# Patient Record
Sex: FEMALE | Race: WHITE | NOT HISPANIC OR LATINO | Employment: FULL TIME | URBAN - METROPOLITAN AREA
[De-identification: names, ages, dates, MRNs, and addresses within clinical notes are randomized per-mention and may not be internally consistent; named-entity substitution may affect disease eponyms.]

---

## 2024-03-12 NOTE — PROGRESS NOTES
CC: This 54 y.o. female presents for management of diabetes  and chronic conditions pending review including HTN, HLP, hypothyroidism     HPI: She was diagnosed with T2DM in October 2023. Has never been hospitalized r/t DM.  Family hx of DM: father and father's side of the family    Arrives new to endocrine today.   Previously seen by Dr Damon, A1C 12% at diagnosed, started on MDI and ozempic  Has lost 10 lbs since since diagnosis  Wearing Freestyle Guero 3- see download in Media tab  BG > 250 0%  BG > 180  4%  BG   96%  BG < 70 0%  Very small normal pp excursions  BG look terrific, occasional falsely low hypos    Diet: Eats 3 Meals a day, snacks- apple, [place], celery w PB  Exercise: walking 10 mins in am, and at home weekly 20 mins  CURRENT DM MEDS: Ozempic 0.5 mg weekly  Standards of Care:  Eye exam:  > 1 year- she will schedule     Hypothyroidism:   Dx'ed in her early 20s  Fmh: brother w hyperthyroidism   She takes LT4 200 mcg daily  Sh was taking Brand until earlier this year for insurance purposes- has not noticed a difference w the change at this time  No hoarseness, voice changes, dysphagia, compressive symptoms, or head/neck exposure to XRT.   No personal or FH of thyroid cancer or MEN syndrome.   Not taking biotin.   No tremors of the hands  No intolerance to the heat or cold  + hair shedding since- Covid + 2022-      ROS:   Gen: Appetite good,    Eyes: Denies visual disturbances  Resp: no SOB or MAHER   Cardiac: No palpitations, chest pain   GI: No nausea or vomiting, diarrhea, constipation   /GYN: No nocturia, burning or pain.   MS/Neuro: Denies numbness/ tingling in BLE; Gait steady, speech clear  Psych: + anxiety.  Other systems: negative.    PE:  GENERAL: Well developed, well nourished.  PSYCH: AAOx3, appropriate mood and affect, pleasant expression, conversant, appears relaxed, well groomed.   EYES: Conjunctiva, corneas clear  NECK: Supple, trachea midline,   VASCULAR: DP pulses +2/4  "bilaterally, no edema.  NEURO: Gait steady  SKIN:  no acanthosis nigracans.  FOOT EXAMINATION: 3/13/2024  No foot deformity, corns or callus formation,  nails in good condition and well trimmed, no interspace maceration or ulceration noted.  Decreased hair growth present over toes/feet.    Protective sensation intact with 10 gram monofilament.  +2 dorsalis pedis and posterior pulses noted.     Personally reviewed Past Medical, Surgical, Social History.    BP (!) 142/84 (BP Location: Right arm, Patient Position: Sitting, BP Method: X-Large (Manual))   Pulse 78   Ht 5' 6" (1.676 m)   Wt 107.6 kg (237 lb 3.4 oz)   BMI 38.29 kg/m²      Personally reviewed the below labs:      Chemistry    No results found for: "NA", "K", "CL", "CO2", "BUN", "CREATININE", "GLU" No results found for: "CALCIUM", "ALKPHOS", "AST", "ALT", "BILITOT", "ESTGFRAFRICA", "EGFRNONAA"         No results found for: "TSH"           No results found for this or any previous visit.  No results found for this or any previous visit.    No results found for: "MICALBCREAT"    No results found for: "HGBA1C"     ASSESSMENT and PLAN:      1. T2DM controlled  Continue Ozempic 0.5 mg weekly and Freestyle Guero 3  Notify me for any issue    2. HTN - un controlled today, continue meds as previously prescribed and monitor.     3. HLP -  on statin therapy, lab w RTC    4. Hypothyroidism- switched from Brand to generic ~ 8 weeks ago, will check TSh now    6. Obesity- working on increasing exercise, discussed goal 30 mins a day, 5 days a week, recently saw DM edu for diet and has been helpful  Body mass index is 38.29 kg/m².    6. Anxiety- chronic, controlled on lexapro     Follow-up: in 6 months with TSH, lipid, UMCR, CMP, 1c          "

## 2024-03-13 ENCOUNTER — OFFICE VISIT (OUTPATIENT)
Dept: ENDOCRINOLOGY | Facility: CLINIC | Age: 54
End: 2024-03-13
Payer: COMMERCIAL

## 2024-03-13 VITALS
HEIGHT: 66 IN | HEART RATE: 78 BPM | DIASTOLIC BLOOD PRESSURE: 84 MMHG | SYSTOLIC BLOOD PRESSURE: 142 MMHG | WEIGHT: 237.19 LBS | BODY MASS INDEX: 38.12 KG/M2

## 2024-03-13 DIAGNOSIS — E66.9 OBESITY (BMI 30-39.9): ICD-10-CM

## 2024-03-13 DIAGNOSIS — E11.9 TYPE 2 DIABETES MELLITUS WITHOUT COMPLICATION, WITHOUT LONG-TERM CURRENT USE OF INSULIN: Primary | ICD-10-CM

## 2024-03-13 DIAGNOSIS — F41.9 ANXIETY: ICD-10-CM

## 2024-03-13 DIAGNOSIS — I10 PRIMARY HYPERTENSION: ICD-10-CM

## 2024-03-13 DIAGNOSIS — E78.2 MIXED HYPERLIPIDEMIA: ICD-10-CM

## 2024-03-13 DIAGNOSIS — E03.9 ACQUIRED HYPOTHYROIDISM: ICD-10-CM

## 2024-03-13 PROCEDURE — 95251 CONT GLUC MNTR ANALYSIS I&R: CPT | Mod: S$GLB,,, | Performed by: NURSE PRACTITIONER

## 2024-03-13 PROCEDURE — 1159F MED LIST DOCD IN RCRD: CPT | Mod: CPTII,S$GLB,, | Performed by: NURSE PRACTITIONER

## 2024-03-13 PROCEDURE — 3079F DIAST BP 80-89 MM HG: CPT | Mod: CPTII,S$GLB,, | Performed by: NURSE PRACTITIONER

## 2024-03-13 PROCEDURE — 99204 OFFICE O/P NEW MOD 45 MIN: CPT | Mod: S$GLB,,, | Performed by: NURSE PRACTITIONER

## 2024-03-13 PROCEDURE — 3008F BODY MASS INDEX DOCD: CPT | Mod: CPTII,S$GLB,, | Performed by: NURSE PRACTITIONER

## 2024-03-13 PROCEDURE — 3077F SYST BP >= 140 MM HG: CPT | Mod: CPTII,S$GLB,, | Performed by: NURSE PRACTITIONER

## 2024-03-13 RX ORDER — ROSUVASTATIN CALCIUM 10 MG/1
10 TABLET, COATED ORAL NIGHTLY
COMMUNITY

## 2024-03-13 RX ORDER — INSULIN LISPRO 100 [IU]/ML
5-11 INJECTION, SOLUTION INTRAVENOUS; SUBCUTANEOUS
COMMUNITY
Start: 2023-10-11 | End: 2024-03-13

## 2024-03-13 RX ORDER — SEMAGLUTIDE 0.68 MG/ML
0.5 INJECTION, SOLUTION SUBCUTANEOUS
COMMUNITY
End: 2024-03-13 | Stop reason: SDUPTHER

## 2024-03-13 RX ORDER — VERAPAMIL HYDROCHLORIDE 240 MG/1
240 TABLET, FILM COATED, EXTENDED RELEASE ORAL
COMMUNITY

## 2024-03-13 RX ORDER — BENZONATATE 200 MG/1
200 CAPSULE ORAL
COMMUNITY
Start: 2024-02-16

## 2024-03-13 RX ORDER — CYCLOSPORINE 0.5 MG/ML
1 EMULSION OPHTHALMIC EVERY 12 HOURS
COMMUNITY
Start: 2023-08-31

## 2024-03-13 RX ORDER — INSULIN GLARGINE 100 [IU]/ML
20 INJECTION, SOLUTION SUBCUTANEOUS
COMMUNITY
Start: 2023-10-11 | End: 2024-03-13

## 2024-03-13 RX ORDER — PEN NEEDLE, DIABETIC 31 GX5/16"
NEEDLE, DISPOSABLE MISCELLANEOUS
COMMUNITY
End: 2024-03-13

## 2024-03-13 RX ORDER — BLOOD-GLUCOSE SENSOR
EACH MISCELLANEOUS
Qty: 6 EACH | Refills: 3 | Status: SHIPPED | OUTPATIENT
Start: 2024-03-13

## 2024-03-13 RX ORDER — LEVOTHYROXINE SODIUM 200 UG/1
200 TABLET ORAL
COMMUNITY

## 2024-03-13 RX ORDER — ESCITALOPRAM OXALATE 20 MG/1
20 TABLET ORAL
COMMUNITY

## 2024-03-13 RX ORDER — GLUCAGON INJECTION, SOLUTION 1 MG/.2ML
1 INJECTION, SOLUTION SUBCUTANEOUS
COMMUNITY
Start: 2023-10-20

## 2024-03-13 RX ORDER — ALBUTEROL SULFATE 90 UG/1
2 AEROSOL, METERED RESPIRATORY (INHALATION) EVERY 4 HOURS PRN
COMMUNITY

## 2024-03-13 RX ORDER — SEMAGLUTIDE 0.68 MG/ML
0.5 INJECTION, SOLUTION SUBCUTANEOUS
Qty: 9 ML | Refills: 3 | Status: SHIPPED | OUTPATIENT
Start: 2024-03-13

## 2024-03-13 RX ORDER — BLOOD-GLUCOSE SENSOR
1 EACH MISCELLANEOUS
COMMUNITY
Start: 2023-10-20 | End: 2024-03-13 | Stop reason: SDUPTHER

## 2024-03-13 RX ORDER — ONDANSETRON 8 MG/1
8 TABLET, ORALLY DISINTEGRATING ORAL
COMMUNITY
Start: 2023-10-20

## 2024-11-21 ENCOUNTER — TELEPHONE (OUTPATIENT)
Dept: ENDOCRINOLOGY | Facility: CLINIC | Age: 54
End: 2024-11-21
Payer: COMMERCIAL

## 2024-11-21 NOTE — TELEPHONE ENCOUNTER
Rec'd fax from [facility] that Guero 3 sensors are approved from 11/18/2024-11/18/2025.

## 2024-12-27 ENCOUNTER — TELEPHONE (OUTPATIENT)
Dept: ENDOCRINOLOGY | Facility: CLINIC | Age: 54
End: 2024-12-27
Payer: COMMERCIAL

## 2024-12-27 DIAGNOSIS — E11.9 TYPE 2 DIABETES MELLITUS WITHOUT COMPLICATION, WITHOUT LONG-TERM CURRENT USE OF INSULIN: Primary | ICD-10-CM

## 2024-12-27 NOTE — TELEPHONE ENCOUNTER
S/w pt. Moved pt appt up earlier to 2:30pm. Sent pts lab orders to [employer] [facility] [employer] and also printed and mailed them to pt. Verbalized understanding.

## 2024-12-27 NOTE — TELEPHONE ENCOUNTER
----- Message from Jessica sent at 12/27/2024  8:28 AM CST -----  Regarding: advise  Contact: patient  Type: Needs Medical Advice  Who Called:  patient  Symptoms (please be specific):    How long has patient had these symptoms:    Pharmacy name and phone #:    Best Call Back Number: [phone]    Additional Information: pako corona can you check and see if carley arana has a sooner time on 1/13/2025 MRN: [id number] AIDE BARNETT .

## 2025-01-13 ENCOUNTER — OFFICE VISIT (OUTPATIENT)
Dept: ENDOCRINOLOGY | Facility: CLINIC | Age: 55
End: 2025-01-13
Payer: COMMERCIAL

## 2025-01-13 VITALS
OXYGEN SATURATION: 98 % | BODY MASS INDEX: 39.58 KG/M2 | DIASTOLIC BLOOD PRESSURE: 84 MMHG | SYSTOLIC BLOOD PRESSURE: 122 MMHG | WEIGHT: 246.25 LBS | HEIGHT: 66 IN | HEART RATE: 69 BPM

## 2025-01-13 DIAGNOSIS — E03.9 ACQUIRED HYPOTHYROIDISM: ICD-10-CM

## 2025-01-13 DIAGNOSIS — I10 PRIMARY HYPERTENSION: ICD-10-CM

## 2025-01-13 DIAGNOSIS — E11.9 TYPE 2 DIABETES MELLITUS WITHOUT COMPLICATION, WITHOUT LONG-TERM CURRENT USE OF INSULIN: Primary | ICD-10-CM

## 2025-01-13 DIAGNOSIS — E78.01 FAMILIAL HYPERCHOLESTEROLEMIA: ICD-10-CM

## 2025-01-13 DIAGNOSIS — E66.9 OBESITY (BMI 30-39.9): ICD-10-CM

## 2025-01-13 PROCEDURE — G2211 COMPLEX E/M VISIT ADD ON: HCPCS | Mod: S$GLB,,, | Performed by: NURSE PRACTITIONER

## 2025-01-13 PROCEDURE — 3044F HG A1C LEVEL LT 7.0%: CPT | Mod: CPTII,S$GLB,, | Performed by: NURSE PRACTITIONER

## 2025-01-13 PROCEDURE — 3074F SYST BP LT 130 MM HG: CPT | Mod: CPTII,S$GLB,, | Performed by: NURSE PRACTITIONER

## 2025-01-13 PROCEDURE — 99214 OFFICE O/P EST MOD 30 MIN: CPT | Mod: S$GLB,,, | Performed by: NURSE PRACTITIONER

## 2025-01-13 PROCEDURE — 3079F DIAST BP 80-89 MM HG: CPT | Mod: CPTII,S$GLB,, | Performed by: NURSE PRACTITIONER

## 2025-01-13 PROCEDURE — 3008F BODY MASS INDEX DOCD: CPT | Mod: CPTII,S$GLB,, | Performed by: NURSE PRACTITIONER

## 2025-01-13 PROCEDURE — 1159F MED LIST DOCD IN RCRD: CPT | Mod: CPTII,S$GLB,, | Performed by: NURSE PRACTITIONER

## 2025-01-13 PROCEDURE — 99999 PR PBB SHADOW E&M-EST. PATIENT-LVL III: CPT | Mod: PBBFAC,,, | Performed by: NURSE PRACTITIONER

## 2025-01-13 PROCEDURE — 95251 CONT GLUC MNTR ANALYSIS I&R: CPT | Mod: S$GLB,,, | Performed by: NURSE PRACTITIONER

## 2025-01-13 RX ORDER — AZELASTINE 1 MG/ML
SPRAY, METERED NASAL
COMMUNITY

## 2025-01-13 RX ORDER — SEMAGLUTIDE 0.68 MG/ML
INJECTION, SOLUTION SUBCUTANEOUS
Qty: 9 ML | Refills: 3 | Status: SHIPPED | OUTPATIENT
Start: 2025-01-13

## 2025-01-13 RX ORDER — ROSUVASTATIN CALCIUM 10 MG/1
10 TABLET, COATED ORAL NIGHTLY
Qty: 90 TABLET | Refills: 3 | Status: SHIPPED | OUTPATIENT
Start: 2025-01-13

## 2025-01-13 RX ORDER — LEVOTHYROXINE SODIUM 200 UG/1
200 TABLET ORAL
Qty: 90 TABLET | Refills: 3 | Status: SHIPPED | OUTPATIENT
Start: 2025-01-13

## 2025-01-13 NOTE — PROGRESS NOTES
"CC: This 54 y.o. female presents for management of diabetes  and chronic conditions pending review including HTN, HLP, hypothyroidism     HPI: She was diagnosed with T2DM in October 2023. Has never been hospitalized r/t DM.  Family hx of DM: father and father's side of the family    Mother passed away ~ Thanksgiving   Has not been taking ozempic or crestor   She has missed some LT4 doses   Wearing Freestyle Guero 3- see download in Media tab  BG > 250 0%  BG > 180  11%  BG  89%  BG < 70 0%  Very small normal pp excursions  BG look terrific, occasional falsely low hypos    Diet: Eats 2 Meals a day, snacks- apple, [place], celery w PB  Skips breakfast   Exercise: none  CURRENT DM MEDS: none  Standards of Care:  Eye exam:  7/2024     Hypothyroidism:   Dx'ed in her early 20s  Fmh: brother w hyperthyroidism   She takes LT4 200 mcg daily  No hoarseness, voice changes, dysphagia, compressive symptoms, or head/neck exposure to XRT.   No personal or FH of thyroid cancer or MEN syndrome.   Not taking biotin.   No tremors of the hands   No intolerance to the heat or cold  + hair shedding since- Covid + 2022-    Works in [employer]    ROS:   Gen: Appetite good,    Eyes: Denies visual disturbances  Resp: no SOB or MAHER   Cardiac: + palpitations at night maybe once a month lasts a few seconds "feels like a fish flopping" after getting into bed- none recently; no chest pain   GI: No nausea or vomiting, diarrhea, constipation   /GYN: No nocturia, burning or pain.   MS/Neuro: Denies numbness/ tingling in BLE; Gait steady, speech clear  Psych: + anxiety.  Other systems: negative.    PE:  GENERAL: Well developed, well nourished.  PSYCH: AAOx3, appropriate mood and affect, pleasant expression, conversant, appears relaxed, well groomed.   EYES: Conjunctiva, corneas clear  NECK: Supple, trachea midline,   VASCULAR: DP pulses +2/4 bilaterally, no edema.  NEURO: Gait steady  SKIN:  no acanthosis nigracans.  FOOT EXAMINATION: " "1/13/2025  No foot deformity, corns or callus formation,  nails in good condition and well trimmed, no interspace maceration or ulceration noted.  Decreased hair growth present over toes/feet.   Protective sensation intact with 10 gram monofilament.  +2 dorsalis pedis and posterior pulses noted.     Personally reviewed Past Medical, Surgical, Social History.    /84 (BP Location: Right arm, Patient Position: Sitting)   Pulse 69   Ht 5' 6" (1.676 m)   Wt 111.7 kg (246 lb 4.1 oz)   SpO2 98%   BMI 39.75 kg/m²      Personally reviewed the below labs:      Chemistry        Component Value Date/Time     01/11/2025 0818    K 4.3 01/11/2025 0818     01/11/2025 0818    CO2 27 01/11/2025 0818    BUN 10 01/11/2025 0818    CREATININE 0.76 01/11/2025 0818     (H) 01/11/2025 0818        Component Value Date/Time    CALCIUM 9.4 01/11/2025 0818    ALKPHOS 95 10/19/2023 0708    AST 17 01/11/2025 0818    ALT 19 01/11/2025 0818    BILITOT 0.6 01/11/2025 0818            Lab Results   Component Value Date    TSH 4.80 (H) 01/11/2025       Recent Labs   Lab 01/11/25  0818   LDL Cholesterol 192 H   HDL 46 L   Cholesterol 266 H        No results found for this or any previous visit.  No results found for this or any previous visit.    No results found for: "MICALBCREAT"    Hemoglobin A1C   Date Value Ref Range Status   01/11/2025 6.6 (H) <5.7 % of total Hgb Final     Comment:     For someone without known diabetes, a hemoglobin A1c  value of 6.5% or greater indicates that they may have   diabetes and this should be confirmed with a follow-up   test.     For someone with known diabetes, a value <7% indicates   that their diabetes is well controlled and a value   greater than or equal to 7% indicates suboptimal   control. A1c targets should be individualized based on   duration of diabetes, age, comorbid conditions, and   other considerations.     Currently, no consensus exists regarding use of  hemoglobin A1c for " diagnosis of diabetes for children.         01/07/2025 TNP % of total Hgb Final     Comment:     TEST NOT PERFORMED    No suitable specimen received.  Please review the test  requirements at   [email or web address]   05/02/2023 7.1 (H) <5.7 % of total Hgb Final     Comment:     For someone without known diabetes, a hemoglobin A1c  value of 6.5% or greater indicates that they may have   diabetes and this should be confirmed with a follow-up   test.    For someone with known diabetes, a value <7% indicates   that their diabetes is well controlled and a value   greater than or equal to 7% indicates suboptimal   control. A1c targets should be individualized based on   duration of diabetes, age, comorbid conditions, and   other considerations.    Currently, no consensus exists regarding use of  hemoglobin A1c for diagnosis of diabetes for children.       01/13/2022 7.9 (H) <5.7 % of total Hgb Final     Comment:     For someone without known diabetes, a hemoglobin A1c  value of 6.5% or greater indicates that they may have   diabetes and this should be confirmed with a follow-up   test.    For someone with known diabetes, a value <7% indicates   that their diabetes is well controlled and a value   greater than or equal to 7% indicates suboptimal   control. A1c targets should be individualized based on   duration of diabetes, age, comorbid conditions, and   other considerations.    Currently, no consensus exists regarding use of  hemoglobin A1c for diagnosis of diabetes for children.         % HEMOGLOBIN A1C   Date Value Ref Range Status   05/02/2023 7.1 (H) <5.7 % of total Hgb Final     Comment:     For someone without known diabetes, a hemoglobin A1c  value of 6.5% or greater indicates that they may have   diabetes and this should be confirmed with a follow-up   test.    For someone with known diabetes, a value <7% indicates   that their diabetes is well controlled and a value   greater than or equal to 7%  indicates suboptimal   control. A1c targets should be individualized based on   duration of diabetes, age, comorbid conditions, and   other considerations.    Currently, no consensus exists regarding use of  hemoglobin A1c for diagnosis of diabetes for children.            ASSESSMENT and PLAN:      1. T2DM controlled  Start Ozempic 0.25 mg once weekly x 4 weeks.  At week 5 increase to 0.5 mg weekly    No fmh MEN or medullary thyroid cancer  No personal hx of pancreatitis    Continue Guero 3, notify me for issues    2. HTN - controlled today, continue meds as previously prescribed and monitor.     3. HLP, +fmh HLP- resume statin     4. Hypothyroidism- has missed a few LT4 doses, Check lab w RTC, clinically euthyroid       5. Obesity-  Encouraged walking in her neighborhood  Body mass index is 39.75 kg/m².       Follow-up: in 6 months with A1C, UMCR, TSH

## 2025-02-25 ENCOUNTER — PATIENT MESSAGE (OUTPATIENT)
Dept: ENDOCRINOLOGY | Facility: CLINIC | Age: 55
End: 2025-02-25
Payer: COMMERCIAL

## 2025-04-28 DIAGNOSIS — E11.9 TYPE 2 DIABETES MELLITUS WITHOUT COMPLICATION, WITHOUT LONG-TERM CURRENT USE OF INSULIN: ICD-10-CM

## 2025-04-28 RX ORDER — BLOOD-GLUCOSE SENSOR
EACH MISCELLANEOUS
Qty: 6 EACH | Refills: 3 | Status: SHIPPED | OUTPATIENT
Start: 2025-04-28

## 2025-05-07 ENCOUNTER — PATIENT MESSAGE (OUTPATIENT)
Dept: ENDOCRINOLOGY | Facility: CLINIC | Age: 55
End: 2025-05-07
Payer: COMMERCIAL

## 2025-05-07 DIAGNOSIS — E11.9 TYPE 2 DIABETES MELLITUS WITHOUT COMPLICATION, WITHOUT LONG-TERM CURRENT USE OF INSULIN: ICD-10-CM

## 2025-05-07 RX ORDER — BLOOD-GLUCOSE SENSOR
EACH MISCELLANEOUS
Qty: 6 EACH | Refills: 3 | Status: SHIPPED | OUTPATIENT
Start: 2025-05-07

## 2025-07-09 ENCOUNTER — TELEPHONE (OUTPATIENT)
Dept: FAMILY MEDICINE | Facility: CLINIC | Age: 55
End: 2025-07-09
Payer: COMMERCIAL

## 2025-07-15 ENCOUNTER — TELEPHONE (OUTPATIENT)
Dept: FAMILY MEDICINE | Facility: CLINIC | Age: 55
End: 2025-07-15
Payer: COMMERCIAL

## 2025-07-15 NOTE — TELEPHONE ENCOUNTER
Copied from CRM #[id number]. Topic: Appointments - Amb Referral  >> Jul 15, 2025 10:31 AM Kenya wrote:  Patient just now seeing [employer] message. She can come in for fasting labs prior to appt. Please schedule pt for Thursday 7/17 [facility]. Pt can be reached at [phone].

## 2025-07-18 ENCOUNTER — OFFICE VISIT (OUTPATIENT)
Dept: FAMILY MEDICINE | Facility: CLINIC | Age: 55
End: 2025-07-18
Payer: COMMERCIAL

## 2025-07-18 ENCOUNTER — HOSPITAL ENCOUNTER (OUTPATIENT)
Dept: RADIOLOGY | Facility: HOSPITAL | Age: 55
Discharge: HOME | End: 2025-07-18
Attending: FAMILY MEDICINE
Payer: COMMERCIAL

## 2025-07-18 VITALS
SYSTOLIC BLOOD PRESSURE: 130 MMHG | BODY MASS INDEX: 39.88 KG/M2 | HEIGHT: 66 IN | OXYGEN SATURATION: 97 % | HEART RATE: 71 BPM | WEIGHT: 248.13 LBS | DIASTOLIC BLOOD PRESSURE: 88 MMHG

## 2025-07-18 DIAGNOSIS — Z79.899 ENCOUNTER FOR LONG-TERM (CURRENT) USE OF MEDICATIONS: ICD-10-CM

## 2025-07-18 DIAGNOSIS — Z82.49 FAMILY HISTORY OF HEART DISEASE: ICD-10-CM

## 2025-07-18 DIAGNOSIS — M89.8X1 PAIN OF RIGHT CLAVICLE: ICD-10-CM

## 2025-07-18 DIAGNOSIS — E55.9 VITAMIN D DEFICIENCY: ICD-10-CM

## 2025-07-18 DIAGNOSIS — J30.2 SEASONAL ALLERGIES: ICD-10-CM

## 2025-07-18 DIAGNOSIS — M54.50 CHRONIC BILATERAL LOW BACK PAIN WITHOUT SCIATICA: ICD-10-CM

## 2025-07-18 DIAGNOSIS — M25.551 RIGHT HIP PAIN: ICD-10-CM

## 2025-07-18 DIAGNOSIS — I15.2 HYPERTENSION ASSOCIATED WITH DIABETES: ICD-10-CM

## 2025-07-18 DIAGNOSIS — M62.838 MUSCLE SPASM: ICD-10-CM

## 2025-07-18 DIAGNOSIS — G89.29 CHRONIC BILATERAL LOW BACK PAIN WITHOUT SCIATICA: ICD-10-CM

## 2025-07-18 DIAGNOSIS — F41.9 ANXIETY: ICD-10-CM

## 2025-07-18 DIAGNOSIS — E11.9 TYPE 2 DIABETES MELLITUS WITHOUT COMPLICATION, WITHOUT LONG-TERM CURRENT USE OF INSULIN: ICD-10-CM

## 2025-07-18 DIAGNOSIS — E78.5 HYPERLIPIDEMIA ASSOCIATED WITH TYPE 2 DIABETES MELLITUS: Chronic | ICD-10-CM

## 2025-07-18 DIAGNOSIS — Z00.00 ENCOUNTER FOR MEDICAL EXAMINATION TO ESTABLISH CARE: Primary | ICD-10-CM

## 2025-07-18 DIAGNOSIS — E11.59 HYPERTENSION ASSOCIATED WITH DIABETES: ICD-10-CM

## 2025-07-18 DIAGNOSIS — E11.69 HYPERLIPIDEMIA ASSOCIATED WITH TYPE 2 DIABETES MELLITUS: Chronic | ICD-10-CM

## 2025-07-18 PROBLEM — K90.49 FOOD INTOLERANCE: Status: ACTIVE | Noted: 2025-07-18

## 2025-07-18 PROBLEM — K57.30 DIVERTICULOSIS OF LARGE INTESTINE WITHOUT PERFORATION OR ABSCESS WITHOUT BLEEDING: Status: ACTIVE | Noted: 2023-05-12

## 2025-07-18 PROBLEM — Z91.018 MULTIPLE FOOD ALLERGIES: Status: ACTIVE | Noted: 2024-10-08

## 2025-07-18 PROBLEM — G47.30 SLEEP APNEA, UNSPECIFIED: Status: ACTIVE | Noted: 2025-07-18

## 2025-07-18 PROBLEM — J45.30 MILD PERSISTENT ASTHMA: Status: ACTIVE | Noted: 2025-07-18

## 2025-07-18 PROBLEM — E06.3 HYPOTHYROIDISM DUE TO HASHIMOTO'S THYROIDITIS: Status: ACTIVE | Noted: 2021-05-19

## 2025-07-18 PROBLEM — E06.3 HASHIMOTO'S THYROIDITIS: Status: ACTIVE | Noted: 2022-09-08

## 2025-07-18 PROBLEM — K29.30 CHRONIC SUPERFICIAL GASTRITIS WITHOUT BLEEDING: Status: ACTIVE | Noted: 2025-07-18

## 2025-07-18 PROBLEM — E78.2 MIXED HYPERLIPIDEMIA: Chronic | Status: ACTIVE | Noted: 2024-03-13

## 2025-07-18 PROBLEM — K21.9 GASTRO-ESOPHAGEAL REFLUX DISEASE WITHOUT ESOPHAGITIS: Status: ACTIVE | Noted: 2025-07-18

## 2025-07-18 PROBLEM — R74.8 ELEVATED LIVER ENZYMES: Status: ACTIVE | Noted: 2024-10-08

## 2025-07-18 PROBLEM — E66.01 MORBID OBESITY: Status: ACTIVE | Noted: 2021-05-19

## 2025-07-18 PROBLEM — K64.8 INTERNAL HEMORRHOID: Status: ACTIVE | Noted: 2025-07-18

## 2025-07-18 PROBLEM — G47.30 SLEEP APNEA, UNSPECIFIED: Chronic | Status: ACTIVE | Noted: 2025-07-18

## 2025-07-18 PROBLEM — J45.20 INTERMITTENT ASTHMA, UNCOMPLICATED: Status: ACTIVE | Noted: 2022-09-08

## 2025-07-18 PROBLEM — G47.33 OBSTRUCTIVE SLEEP APNEA SYNDROME: Status: ACTIVE | Noted: 2019-11-24

## 2025-07-18 PROBLEM — Z86.39 H/O: HYPOTHYROIDISM: Status: ACTIVE | Noted: 2022-09-08

## 2025-07-18 PROCEDURE — 73000 X-RAY EXAM OF COLLAR BONE: CPT | Mod: TC,PO,RT

## 2025-07-18 PROCEDURE — 73000 X-RAY EXAM OF COLLAR BONE: CPT | Mod: 26,RT,, | Performed by: RADIOLOGY

## 2025-07-18 PROCEDURE — 99999 PR PBB SHADOW E&M-EST. PATIENT-LVL V: CPT | Mod: PBBFAC,,, | Performed by: FAMILY MEDICINE

## 2025-07-18 RX ORDER — NAPROXEN 500 MG/1
500 TABLET ORAL DAILY PRN
Qty: 60 TABLET | Refills: 12 | Status: SHIPPED | OUTPATIENT
Start: 2025-07-18

## 2025-07-18 RX ORDER — VERAPAMIL HCL 240 MG
240 TABLET, EXTENDED RELEASE ORAL DAILY
Qty: 90 TABLET | Refills: 3 | Status: SHIPPED | OUTPATIENT
Start: 2025-07-18

## 2025-07-18 RX ORDER — NAPROXEN 500 MG/1
500 TABLET ORAL DAILY PRN
COMMUNITY
Start: 2025-03-12 | End: 2025-07-18 | Stop reason: SDUPTHER

## 2025-07-18 RX ORDER — METHOCARBAMOL 500 MG/1
500 TABLET, FILM COATED ORAL EVERY 8 HOURS PRN
COMMUNITY
Start: 2025-03-10 | End: 2025-07-18 | Stop reason: SDUPTHER

## 2025-07-18 RX ORDER — ROSUVASTATIN CALCIUM 10 MG/1
10 TABLET, COATED ORAL NIGHTLY
Qty: 90 TABLET | Refills: 3 | Status: SHIPPED | OUTPATIENT
Start: 2025-07-18

## 2025-07-18 RX ORDER — ESCITALOPRAM OXALATE 20 MG/1
20 TABLET ORAL DAILY
Qty: 90 TABLET | Refills: 4 | Status: SHIPPED | OUTPATIENT
Start: 2025-07-18

## 2025-07-18 RX ORDER — METHOCARBAMOL 500 MG/1
500 TABLET, FILM COATED ORAL EVERY 8 HOURS PRN
Qty: 90 TABLET | Refills: 4 | Status: SHIPPED | OUTPATIENT
Start: 2025-07-18

## 2025-07-18 RX ORDER — AZELASTINE 1 MG/ML
1 SPRAY, METERED NASAL 2 TIMES DAILY
Qty: 30 ML | Refills: 1 | Status: SHIPPED | OUTPATIENT
Start: 2025-07-18

## 2025-07-18 NOTE — PATIENT INSTRUCTIONS
Follow up in about 1 year (around 7/18/2026), or if symptoms worsen or fail to improve.     Dear patient,   As a result of recent federal legislation (The Federal Cures Act), you may receive lab or pathology results from your visit in your [facility] account before your physician is able to contact you. Your physician or their representative will relay the results to you with their recommendations at their soonest availability.     If no improvement in symptoms or symptoms worsen, please be advised to call MD, follow-up at clinic and/or go to ER if becomes severe.    Spenser Phillips M.D.        We Offer TELEHEALTH & Same Day Appointments!   Book your Telehealth appointment with me through my nurse or   Clinic appointments on [employer]!    [address] [place] [address]    Office: [phone]   FAX: [phone]    Check out my Facebook Page and Follow Me at: [email or web address]    Check out my website at [employer] by clicking on: [email or web address]    To Schedule appointments online, go to [email or web address]: [email or web address]

## 2025-07-18 NOTE — PROGRESS NOTES
This note is specifically for wellness visit performed today.   WELLNESS EXAM      Patient ID: Sowmya Stern is a 55 y.o. female with  has a past medical history of Anxiety disorder, unspecified, Chronic hypertension, Endometriosis, unspecified, Hypothyroidism, unspecified, Leiomyoma of uterus, Migraines, Ovarian cyst, Sleep apnea, unspecified, and Type 2 diabetes mellitus without complications.   Chief Complaint:  Encounter for wellness exam    Well Adult Physical: Patient here for a comprehensive physical exam.The patient reports Chronic problems.  History of Present Illness    CHIEF COMPLAINT:  Patient, a 55-year-old female, presents to establish care and obtain medication refills. She also requests lab work, including a vitamin D level check.    HPI:  Patient reports right hip pain, ongoing for an unspecified duration. X-rays show no arthritis, but she has difficulty pushing off with her right leg when stepping up, concerned about its ability to support her. The pain is intermittent, with periods of no discomfort. When the pain flares up, it returns to the same intensity as previous episodes. She has undergone physical therapy, where she was advised that she is sedentary and needs to increase stretching.    She mentions recent soreness in her right clavicle and neck area, waking up with discomfort lasting for a few hours. The area is tender to touch, but she denies any specific injury.    Patient reports a history of migraines, which she no longer has. She attributes the resolution of her migraines to the cessation of her menstrual cycle, suggesting they were menstrual migraines. 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 At 55 and not having had a cycle in over a year, she reports not having typical menopausal symptoms.    She is evaluated by Dr. Ceballos for allergies and mentions having asthma in the past. She previously received allergy shots  but no longer does. She has not used her inhaler in a few years and plans to follow up with her allergist.    For her hypothyroidism due to Hashimoto's thyroiditis, she is followed by SEBASTIAN Johnson in endocrinology. She has an upcoming virtual appointment and plans to have labs done before that visit.    She denies any current asthma symptoms or menopause symptoms, and any history of heart attacks or strokes.    MEDICATIONS:  Patient is on Lexapro 20 mg daily for anxiety and depression. She uses Astelin for allergies and takes Robaxin (methocarbamol) 500 mg every couple of months for back spasms. Naproxen is used as needed for flare-ups. She is on Rosuvastatin (Crestor) for cholesterol and Verapamil for high blood pressure, which was previously used for migraines. For hypothyroidism due to Hashimoto's thyroiditis, she takes Levothyroxine 200 mcg. Patient has discontinued vitamin D supplement as the prescription ran out, and has also stopped allergy shots.    MEDICAL HISTORY:  Patient has a history of migraine syndrome, hypothyroidism due to Hashimoto's thyroiditis, mild persistent asthma, food allergies, mitral valve prolapse, sleep apnea, seasonal allergic rhinitis, vitamin D deficiency, hypertension, and type 2 diabetes. Patient has received a pneumonia vaccine, possibly at SEBASTIAN Arias's office. She has also received 2 COVID-19 shots and flu vaccines up to 2019. Patient has undergone a partial hysterectomy with left oophorectomy. Her last Pap smear was on February 7th, 2023.    FAMILY HISTORY:  Family history is significant for father who had triple bypass surgery in his 80s and developed dementia. He passed away   at 90 years old.    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    ALLERGIES:  Patient has an egg allergy, which was mentioned in the context of flu vaccines.    SOCIAL HISTORY:  Occupation: HR supervisor at [employer]    ROS:  ROS as indicated in HPI.       "  Reviewed care team:Prev PCP- NP [place]  Endocrine- Dr. Ingrid KINCAID-[place]  Colonoscopy- 1 yr ago Dr. Araseli Staples-Lin in [place]   Diabetes Management Status    Statin: Taking  ACE/ARB: Not taking    Screening or Prevention Patient's value Goal Complete/Controlled?   HgA1C Testing and Control   Lab Results   Component Value Date    HGBA1C 6.6 (H) 01/11/2025      Annually/Less than 8% Yes   Lipid profile : 01/11/2025 Annually Yes   LDL control Lab Results   Component Value Date    LDLCALC 192 (H) 01/11/2025    Annually/Less than 100 mg/dl  No   Nephropathy screening No results found for: "LABMICR"  No results found for: "PROTEINUA"  No results found for: "UTPCR"   Annually No   Blood pressure BP Readings from Last 1 Encounters:   07/18/25 130/88    Less than 140/90 Yes   Dilated retinal exam : 07/31/2024 Annually Yes   Foot exam   : 01/13/2025 Annually Yes     CHRONIC. STABLE. BP Reviewed.  Compliant with BP medications. No SE reported.   (-) CP, SOB, palpitations, dizziness, lightheadedness, HA, arm numbness, tingling or weakness, syncope.  Creatinine   Date Value Ref Range Status   03/12/2025 0.72 0.60 - 1.10 mg/dL Final   01/11/2025 0.76 0.50 - 1.03 mg/dL Final     CHRONIC. STABLE. Lab analysis reviewed.   (-) CP, SOB, abdominal pain, N/V/D, constipation, jaundice, skin changes.  (-) Myalgias  Lab Results   Component Value Date    CHOL 266 (H) 01/11/2025     Lab Results   Component Value Date    HDL 46 (L) 01/11/2025     Lab Results   Component Value Date    LDLCALC 192 (H) 01/11/2025     Lab Results   Component Value Date    TRIG 133 01/11/2025     Lab Results   Component Value Date    CHOLHDL 5.8 (H) 01/11/2025     No results found for: "TOTALCHOLEST"  Lab Results   Component Value Date    ALT 24 03/12/2025    AST 22 03/12/2025    ALKPHOS 74 03/12/2025    BILITOT 0.4 03/12/2025     ======================================================  Chronic. Vit d deficiency. Takes vitamin d supplement. No SE " "reported. Fatigue is slightly improved.   No results found for: "OOARFVRE80HK"     Do you take any herbs or supplements that were not prescribed by a doctor? no   Are you taking calcium supplements? no    History: OBGYN:   LMP: No LMP recorded. Patient is postmenopausal.   MMG:  No relevant family history has been documented.   OCT 31  2024    Mammo Digital Screening Bilat    Narrative: REASON FOR EXAM: [Z12.31]-Encounter for screening mammogram for malignant neoplasm of breast / Screening mammogram    TECHNICAL FACTORS: Digital mammography with tomosynthesis was performed of the breasts in the mediolateral oblique and craniocaudal views. CAD was utilized.    CLINICAL INFORMATION: This is a female patient for screening mammogram. According to the National Cancer [place] Model risk assessment tool, her lifetime breast cancer risk is 6.6% .    COMPARISON: Outside mammogram 02/07/2023    FINDINGS: The breasts are almost entirely fatty. There is no evidence of suspicious mass, calcifications or architectural distortion. Benign calcifications, including vascular, are present bilaterally. There is a stable asymmetry in the mid depth of the  medial left breast.    IMPRESSION:  BI-RADS 2 - Benign    No mammographic findings of malignancy are identified. Annual mammography is recommended.    BREAST DENSITY: Predominantly Fatty    The patient has been entered into our radiology information system, and she will receive notification approximately 30 days prior to the due date of her next annual screening mammogram.    Performing facility: [facility], [place], [address], [phone]    Electronically signed by Berny Adrian MD on 11/1/2024 6:17 AM        PAP: 2/14/2023 FEB 7 2023    CHG CYTOPAT,CER/VAG,THIN LAYER,MAN RES,INTER    Provider   [facility]    Linked Diagnosis   -      Source   [facility]    Document Received "   7/12/2025         Colon cancer screening:   Requesting records    Health Maintenance Topics with due status: Not Due       Topic Last Completion Date    Influenza Vaccine 10/07/2019    Cervical Cancer Screening 02/07/2023    Mammogram 10/31/2024    Lipid Panel 01/11/2025    Foot Exam 01/13/2025    High Dose Statin 07/18/2025    RSV Vaccine (Age 60+ and Pregnant patients) Not Due      ==============================================  History reviewed.   Health Maintenance Due   Topic Date Due    Diabetes Urine Screening  Never done    HIV Screening  Never done    Pneumococcal Vaccines (Age 50+) (1 of 2 - PCV) Never done    Shingles Vaccine (1 of 2) Never done    Colorectal Cancer Screening  09/05/2023    Hemoglobin A1c  07/11/2025    Diabetic Eye Exam  07/31/2025       Past Medical History:  Past Medical History:   Diagnosis Date    Anxiety disorder, unspecified     Chronic hypertension     Endometriosis, unspecified     Hypothyroidism, unspecified     Leiomyoma of uterus     Migraines     Ovarian cyst     Sleep apnea, unspecified     Type 2 diabetes mellitus without complications      Past Surgical History:   Procedure Laterality Date    DIAGNOSTIC LAPAROSCOPY      KIDNEY STONE SURGERY      left knee scope      LITHOTRIPSY       Review of patient's allergies indicates:   Allergen Reactions    Eggs [egg derived]     Green bean Other (See Comments)     Gi issues    Lorazepam Hallucinations and Other (See Comments)     hallucinate    halluciantions    Not able to take    [place] derivatives     Oats (chuck) Diarrhea and Other (See Comments)     Gi issues    Strawberry     Codeine Nausea And Vomiting and Other (See Comments)    Desvenlafaxine Anxiety and Palpitations     GI problem    Hydrocodone-acetaminophen Nausea And Vomiting     Medications Ordered Prior to Encounter[1]  Social History[2]  Family History   Problem Relation Name Age of Onset    Heart disease Father Juan Jose Bonilla     Diabetes Father Juan Jose Bonilla      Hypertension Father Juan Jose Bonilla     Arthritis Mother Sejal Bonilla     Hearing loss Mother Sejal Bonilla     Cancer Maternal Grandmother Kristen Healy     COPD Brother Kasi Bonilla        Review of Systems   Constitutional:  Negative for chills, fatigue, fever and unexpected weight change.   HENT:  Negative for ear pain and sore throat.    Eyes:  Negative for redness and visual disturbance.   Respiratory:  Negative for cough and shortness of breath.    Cardiovascular:  Negative for chest pain and palpitations.   Gastrointestinal:  Negative for nausea and vomiting.   Genitourinary:  Negative for difficulty urinating and hematuria.   Musculoskeletal:  Positive for arthralgias. Negative for myalgias.   Skin:  Negative for rash and wound.   Neurological:  Negative for weakness and headaches.   Psychiatric/Behavioral:  Negative for sleep disturbance. The patient is not nervous/anxious.       Objective:    Nursing note and vitals reviewed.  Vitals:    07/18/25 0812   BP: 130/88   Pulse: 71     Body mass index is 40.04 kg/m².   Physical Exam  Vitals and nursing note reviewed.   Constitutional:       General: She is not in acute distress.     Appearance: She is well-developed. She is not ill-appearing, toxic-appearing or diaphoretic.   HENT:      Head: Normocephalic and atraumatic.      Right Ear: Hearing and external ear normal.      Left Ear: Hearing and external ear normal.      Nose: Nose normal. No rhinorrhea.   Eyes:      General: Lids are normal.      Extraocular Movements: Extraocular movements intact.      Conjunctiva/sclera: Conjunctivae normal.      Pupils: Pupils are equal, round, and reactive to light.   Neck:      Vascular: No carotid bruit.   Cardiovascular:      Rate and Rhythm: Normal rate.      Pulses: Normal pulses.   Pulmonary:      Effort: Pulmonary effort is normal. No respiratory distress.      Breath sounds: Normal breath sounds.   Chest:          Comments: Reproducible tenderness over the  clavicle on the right, no mass or skin changes noted.  Abdominal:      General: Bowel sounds are normal.      Palpations: Abdomen is soft.   Musculoskeletal:         General: Tenderness present. No deformity. Normal range of motion.      Cervical back: Normal range of motion and neck supple.      Right lower leg: No edema.      Left lower leg: No edema.   Lymphadenopathy:      Cervical: No cervical adenopathy.   Skin:     General: Skin is warm and dry.      Capillary Refill: Capillary refill takes less than 2 seconds.      Coloration: Skin is not pale.   Neurological:      General: No focal deficit present.      Mental Status: She is alert and oriented to person, place, and time. She is not disoriented.      Cranial Nerves: No cranial nerve deficit.      Motor: No weakness.      Gait: Gait normal.   Psychiatric:         Attention and Perception: She is attentive.         Mood and Affect: Mood normal. Mood is not anxious or depressed.         Speech: Speech is not rapid and pressured or slurred.         Behavior: Behavior normal. Behavior is not agitated, aggressive or hyperactive. Behavior is cooperative.         Thought Content: Thought content normal. Thought content is not paranoid or delusional. Thought content does not include homicidal or suicidal ideation. Thought content does not include homicidal or suicidal plan.         Cognition and Memory: Memory is not impaired.         Judgment: Judgment normal.       Orders Only on 12/27/2024   Component Date Value Ref Range Status    Hemoglobin A1C 01/11/2025 6.6 (H)  <5.7 % of total Hgb Final    Comment: For someone without known diabetes, a hemoglobin A1c  value of 6.5% or greater indicates that they may have   diabetes and this should be confirmed with a follow-up   test.     For someone with known diabetes, a value <7% indicates   that their diabetes is well controlled and a value   greater than or equal to 7% indicates suboptimal   control. A1c targets should  be individualized based on   duration of diabetes, age, comorbid conditions, and   other considerations.     Currently, no consensus exists regarding use of  hemoglobin A1c for diagnosis of diabetes for children.          TSH 01/11/2025 4.80 (H)  0.40 - 4.50 mIU/L Final    Glucose 01/11/2025 149 (H)  65 - 99 mg/dL Final    Comment:               Fasting reference interval     For someone without known diabetes, a glucose  value >125 mg/dL indicates that they may have  diabetes and this should be confirmed with a  follow-up test.         BUN 01/11/2025 10  7 - 25 mg/dL Final    Creatinine 01/11/2025 0.76  0.50 - 1.03 mg/dL Final    eGFR 01/11/2025 92  > OR = 60 mL/min/1.73m2 Final    BUN/Creatinine Ratio 01/11/2025 SEE NOTE:  6 - 22 (calc) Final    Comment:    Not Reported: BUN and Creatinine are within     reference range.            Sodium 01/11/2025 141  135 - 146 mmol/L Final    Potassium 01/11/2025 4.3  3.5 - 5.3 mmol/L Final    Chloride 01/11/2025 103  98 - 110 mmol/L Final    CO2 01/11/2025 27  20 - 32 mmol/L Final    Calcium 01/11/2025 9.4  8.6 - 10.4 mg/dL Final    Total Protein 01/11/2025 7.4  6.1 - 8.1 g/dL Final    Albumin 01/11/2025 4.5  3.6 - 5.1 g/dL Final    Globulin, Total 01/11/2025 2.9  1.9 - 3.7 g/dL (calc) Final    Albumin/Globulin Ratio 01/11/2025 1.6  1.0 - 2.5 (calc) Final    Total Bilirubin 01/11/2025 0.6  0.2 - 1.2 mg/dL Final    Alkaline Phosphatase 01/11/2025 83  37 - 153 U/L Final    AST 01/11/2025 17  10 - 35 U/L Final    ALT 01/11/2025 19  6 - 29 U/L Final    Cholesterol 01/11/2025 266 (H)  <200 mg/dL Final    HDL 01/11/2025 46 (L)  > OR = 50 mg/dL Final    Triglycerides 01/11/2025 133  <150 mg/dL Final    LDL Cholesterol 01/11/2025 192 (H)  mg/dL (calc) Final    Comment: LDL-C levels > or = 190 mg/dL may indicate familial   hypercholesterolemia (FH). Clinical assessment and   measurement of blood lipid levels should be   considered for all first degree relatives of patients  with an FH  diagnosis. LDL Cholesterol (LDL-C)  levels > or = 300 mg/dL may indicate homozygous  familial hypercholesterolemia (HoFH). Untreated,   these extremely high LDL-C levels can result in  premature CV events and mortality. Patients should  be identified early and provided appropriate  interventions to reduce the cumulative LDL-C  burden from birth.     For questions about testing for familial  hypercholesterolemia, please call [facility] at [phone].     Luiza THAKKAR, et al. J National Lipid Association   Recommendations for Patient-Centered  Management of Dyslipidemia: Part 1 Journal of   Clinical Lipidology 2015;9(2), 129-169.  KAREL Montilla. et al. (2014). Homozygous familial  hypercholesterolaemia: new insights and guidance  for clinicians to impro                           ve detection and clinical  management.  Heart Journal, 35(32),  0560-9245.  Reference range: <100     Desirable range <100 mg/dL for primary prevention;    <70 mg/dL for patients with CHD or diabetic patients   with > or = 2 CHD risk factors.     LDL-C is now calculated using the Jeff-Francisco   calculation, which is a validated novel method providing   better accuracy than the Friedewald equation in the   estimation of LDL-C.   Jeff MARIN et al. KYLIE. 2013;310(19): 9572-4614   (http://education.OwnerListens.[employer] Power/faq/RON432)      HDL/Cholesterol Ratio 01/11/2025 5.8 (H)  <5.0 (calc) Final    Non HDL Chol. (LDL+VLDL) 01/11/2025 220 (H)  <130 mg/dL (calc) Final    Comment: Non-HDL level > or = 220 is very high and may indicate   genetic familial hypercholesterolemia (FH). Clinical   assessment and measurement of blood lipid levels   should be considered for all first-degree relatives   of patients with an FH diagnosis.     For patients with diabetes plus 1 major ASCVD risk   factor, treating to a non-HDL-C goal of <100 mg/dL   (LDL-C of <70 mg/dL) is considered a therapeutic   option.      Physical Exam     Musculoskeletal: Right clavicle tenderness.  Cardiovascular: All normal.  Lungs: All normal.  TEST RESULTS:  Patient's Vitamin D levels were low in March 2024. Her cholesterol levels were high, though the date of this test was not specified.  IMAGING:  A bilateral hip X-ray in October 2024 showed no evidence of acute fracture, no subluxation, maintained joint spaces, and no significant soft tissue abnormalities. A back X-ray, date not specified, revealed some arthritis or degenerative changes in the lower back. A mammogram on October 31, 2024, was benign with a BIRADS 2 classification.       Assessment / Plan:      1.  ANNUAL WELLNESS EXAM -patient here for annual wellness exam.  Labs ordered.  Health maintenance was reviewed and ordered.  Medications were reviewed and reconciled.   Anticipatory guidance: Don't smoke.  Healthy diet and regular exercise recommended. Vaccine recommendations discussed.  See orders.  Reviewed Anticipatory guidance, risk factor reduction interventions and counseling, Complete history , physical was completed today.  Complete and thorough medication reconciliation was performed.  Discussed risks and benefits of medications.  Advised patient on orders and health maintenance.  We discussed old records and old labs if available.  Will request any records not available through epic.  Continue current medications listed on your summary sheet.  Assessment & Plan    E11.9 Type 2 diabetes mellitus without complication, without long-term current use of insulin  Z00.00 Encounter for medical exam to establish care  F41.9 Anxiety  E11.59, I15.2 Hypertension associated with diabetes  M62.838 Muscle spasm  M54.50, G89.29 Chronic bilateral low back pain without sciatica  J30.2 Seasonal allergies  E55.9 Vitamin D deficiency  Z79.899 Encounter for long-term (current) use of medications  M25.551 Right hip pain  E11.69, E78.5 Hyperlipidemia associated with type 2 diabetes mellitus  Z82.49 Family history  of heart disease  M89.8X1 Pain of right clavicle    IMPRESSION:   Reviewed medical history and reconciled problems from outside records.   Evaluated vitamin D deficiency and ordered vitamin D level check.   Hip pain symptoms and imaging results indicate need for orthopedic evaluation and possible MRI. Explained XRs are not the best modality for evaluating cartilage in hip joints.   Right clavicle tenderness requires XR to rule out bone abnormalities.   Pneumonia, shingles, and tetanus vaccines needed.   Cholesterol levels slightly elevated.   Discussed potential hormonal influences on joint health during menopause.    PLAN SUMMARY:   Continue verapamil for BP control   Continue naproxen for pain management during flare-ups   Discontinue prescription vitamin D 50,000 units weekly   Advise on OTC vitamin D supplementation as alternative   Continue Lexapro 20 mg daily for anxiety and depression   Continue Robaxin (methocarbamol) 500 mg for back spasms   Continue Astelin for allergies   Continue rosuvastatin (Crestor) for cholesterol management   Order XR Right Clavicle   Order lab work   Refer to orthopedics for evaluation   Provide information on Shingrix vaccine for shingles prevention   Provide information on tetanus vaccine    ENCOUNTER FOR MEDICAL EXAMINATION TO ESTABLISH CARE:   Provided information on Shingrix vaccine for shingles prevention (2-dose series, good for life) and tetanus vaccine (effective for 10 years).   Ordered lab work.    ANXIETY:   Continued Lexapro 20 mg daily for anxiety and depression.    HYPERTENSION ASSOCIATED WITH DIABETES:   Continued verapamil for BP control.    MUSCLE SPASM:   Continued Robaxin (methocarbamol) 500 mg for back spasms.    CHRONIC BILATERAL LOW BACK PAIN WITHOUT SCIATICA:   Continued naproxen for pain management during flare-ups.    SEASONAL ALLERGIES:   Continued Astelin for allergies.    VITAMIN D DEFICIENCY:   Discontinued prescription vitamin D 50,000 units weekly.    Advised on OTC vitamin D supplementation as an alternative.    RIGHT HIP PAIN:   Referred to orthopedics for evaluation.    HYPERLIPIDEMIA ASSOCIATED WITH TYPE 2 DIABETES MELLITUS:   Continued rosuvastatin (Crestor) for cholesterol management.    PAIN OF RIGHT CLAVICLE:   Ordered XR Right Clavicle.       All questions were answered. Patient had no further concerns. Advised of Wellness plan. Follow up in 1 year for ANNUAL WELLNESS EXAM  Please be advised of condition course.  SNRI/SSRI is first-line treatment for this condition.  Please be advised of the risk of discontinuing this medication without tapering/contacting MD.  Patient has been advised of side effects, and all questions were answered.  Patient voiced understanding.  Patient will follow up routinely and notify us if having any side effects or worsening or persistent symptoms.  ER precautions were given. Antidepressant/Antianxiety Medication Initiation:  Patient informed of risks, benefits, and potential side effects of medication and accepts informed consent.  Common side effects include nausea, fatigue, headache, insomnia, etc see medication insert for complete side effect profile.  Most importantly be advised of the possibility of new or worsening suicidal thoughts/depression/anxiety etcetera.  Please be advised to stop the medication immediately and seek urgent treatment if this occurs.  Therefore please do not to abruptly discontinue medication without physician guidance except in cases of sudden onset or worsening of SI.   Counseled on importance of hypertension disease course, I recommend ongoing Education for DASH-diet and exercise.  Counseled on medication regimen importance of treating high blood pressure.  Please be advised of risk of untreated blood pressure as discussed.  Please advised of ER precautions were given for symptoms of hypertensive urgency and emergency.  Counseled on hyperlipidemia disease course, healthy diet and increased need  "for exercise.  Please be advised of the risk of cardiovascular disease, increase stroke and heart attack risk with uncontrolled/untreated hyperlipidemia.     Patient voiced understanding and understood the treatment plan. All questions were answered.   We will plan to monitor hemoglobin A1c at designated intervals 3 to 6 months.  I recommend ongoing Education for diabetic diet and exercise protocol.  We will continue to monitor for side effects.    Please be advised of symptoms to monitor for and to notify me immediately if persistent or worsening.  Follow up with Ophthalmology/Optometry and Podiatry at least annually.  Follow-up with all specialists.  CHRONIC. Stable. Compliant with medications for managed conditions. See medication list. No SE reported.   Routine lab analysis is being monitored. Refills were addressed.  No results found for: "WBC", "HGB", "HCT", "MCV", "PLT"      Chemistry        Component Value Date/Time     03/12/2025 1436     01/11/2025 0818    K 3.9 03/12/2025 1436    K 4.3 01/11/2025 0818     01/11/2025 0818    CO2 27 03/12/2025 1436    CO2 27 01/11/2025 0818    BUN 14 03/12/2025 1436    BUN 10 01/11/2025 0818    CREATININE 0.72 03/12/2025 1436    CREATININE 0.76 01/11/2025 0818     (H) 01/11/2025 0818        Component Value Date/Time    CALCIUM 10 03/12/2025 1436    CALCIUM 9.4 01/11/2025 0818    ALKPHOS 74 03/12/2025 1436    AST 22 03/12/2025 1436    AST 17 01/11/2025 0818    ALT 24 03/12/2025 1436    ALT 19 01/11/2025 0818    BILITOT 0.4 03/12/2025 1436    BILITOT 0.6 01/11/2025 0818          Lab Results   Component Value Date    TSH 4.80 (H) 01/11/2025     Complete history and physical was completed today.  Complete and thorough medication reconciliation was performed.  Discussed risks and benefits of medications.  Advised patient on orders and health maintenance.  We discussed old records and old labs if available.  Will request any records not available through " epic.  Continue current medications listed on your summary sheet.    Orders Placed This Encounter   Procedures    X-Ray Clavicle Right     Standing Status:   Future     Expected Date:   2025     Expiration Date:   2026     May the Radiologist modify the order per protocol to meet the clinical needs of the patient?:   Yes     Release to patient:   Immediate    Vitamin D     Standing Status:   Future     Expected Date:   2025     Expiration Date:   2026    Ambulatory referral/consult to Orthopedics     Standing Status:   Future     Expected Date:   2025     Expiration Date:   2026     Referral Priority:   Routine     Referral Type:   Consultation     Requested Specialty:   Orthopedic Surgery     Number of Visits Requested:   1     Medications Ordered This Encounter   Medications    azelastine (ASTELIN) 137 mcg (0.1 %) nasal spray     Si spray (137 mcg total) by Nasal route 2 (two) times daily.     Dispense:  30 mL     Refill:  1    EScitalopram oxalate (LEXAPRO) 20 MG tablet     Sig: Take 1 tablet (20 mg total) by mouth once daily.     Dispense:  90 tablet     Refill:  4    methocarbamoL (ROBAXIN) 500 MG Tab     Sig: Take 1 tablet (500 mg total) by mouth every 8 (eight) hours as needed (muscle spasms).     Dispense:  90 tablet     Refill:  4    naproxen (NAPROSYN) 500 MG tablet     Sig: Take 1 tablet (500 mg total) by mouth daily as needed (pain).     Dispense:  60 tablet     Refill:  12    rosuvastatin (CRESTOR) 10 MG tablet     Sig: Take 1 tablet (10 mg total) by mouth every evening.     Dispense:  90 tablet     Refill:  3    verapamiL (CALAN-SR) 240 MG CR tablet     Sig: Take 1 tablet (240 mg total) by mouth once daily.     Dispense:  90 tablet     Refill:  3      Future Appointments       Date Provider Specialty Appt Notes    2025  Radiology .    2025  Lab labs    2025  Lab .    2025 Estefany Johnson DNP, NP Endocrinology  Arrive at: Telehealth 6 mo f/u-  external labs JONATHAN(shares)          This note was generated with the assistance of ambient listening technology. Verbal consent was obtained by the patient and accompanying visitor(s) for the recording of patient appointment to facilitate this note. I attest to having reviewed and edited the generated note for accuracy, though some syntax or spelling errors may persist. Please contact the author of this note for any clarification.      Spenser Phillips MD             [1]   Current Outpatient Medications on File Prior to Visit   Medication Sig Dispense Refill    albuterol (PROVENTIL/VENTOLIN HFA) 90 mcg/actuation inhaler Inhale 2 puffs into the lungs every 4 (four) hours as needed.      cycloSPORINE (RESTASIS) 0.05 % ophthalmic emulsion Place 1 drop into both eyes every 12 (twelve) hours.      fluticasone furoate (ARNUITY ELLIPTA) 200 mcg/actuation inhaler Inhale 200 mcg into the lungs.      Social Moov JONATHAN 3 SENSOR Esther Changes every 14 days 6 each 3    levothyroxine (SYNTHROID) 200 MCG tablet Take 1 tablet (200 mcg total) by mouth before breakfast. 90 tablet 3    ondansetron (ZOFRAN-ODT) 8 MG TbDL Take 8 mg by mouth.      OZEMPIC 0.25 mg or 0.5 mg (2 mg/3 mL) pen injector Start Ozempic 0.25 mg once weekly x 4 weeks. At week 5 increase to 0.5 mg weekly 9 mL 3    [DISCONTINUED] azelastine (ASTELIN) 137 mcg (0.1 %) nasal spray SMARTSI Spray(s) Both Nares Twice Daily PRN      [DISCONTINUED] EScitalopram oxalate (LEXAPRO) 20 MG tablet Take 20 mg by mouth.      [DISCONTINUED] methocarbamoL (ROBAXIN) 500 MG Tab Take 500 mg by mouth every 8 (eight) hours as needed.      [DISCONTINUED] naproxen (NAPROSYN) 500 MG tablet Take 500 mg by mouth daily as needed.      [DISCONTINUED] rosuvastatin (CRESTOR) 10 MG tablet Take 1 tablet (10 mg total) by mouth every evening. 90 tablet 3    [DISCONTINUED] verapamiL (CALAN-SR) 240 MG CR tablet Take 240 mg by mouth.       No current facility-administered medications on file prior to  visit.   [2]   Social History  Socioeconomic History    Marital status:    Tobacco Use    Smoking status: Never    Smokeless tobacco: Never   Substance and Sexual Activity    Alcohol use: Not Currently    Drug use: Never    Sexual activity: Not Currently     Partners: Female     Birth control/protection: Abstinence     Social Drivers of Health     Financial Resource Strain: Low Risk  (7/17/2025)    Overall Financial Resource Strain (CARDIA)     Difficulty of Paying Living Expenses: Not very hard   Food Insecurity: No Food Insecurity (7/17/2025)    Hunger Vital Sign     Worried About Running Out of Food in the Last Year: Never true     Ran Out of Food in the Last Year: Never true   Transportation Needs: No Transportation Needs (7/17/2025)    PRAPARE - Transportation     Lack of Transportation (Medical): No     Lack of Transportation (Non-Medical): No   Physical Activity: Insufficiently Active (7/17/2025)    Exercise Vital Sign     Days of Exercise per Week: 2 days     Minutes of Exercise per Session: 30 min   Stress: Stress Concern Present (7/17/2025)    [employer] - Occupational Stress Questionnaire     Feeling of Stress : To some extent   Housing Stability: High Risk (7/17/2025)    Housing Stability Vital Sign     Unable to Pay for Housing in the Last Year: Yes     Number of Times Moved in the Last Year: 0     Homeless in the Last Year: No

## 2025-07-23 ENCOUNTER — PATIENT MESSAGE (OUTPATIENT)
Dept: ENDOCRINOLOGY | Facility: CLINIC | Age: 55
End: 2025-07-23
Payer: COMMERCIAL

## 2025-07-23 ENCOUNTER — PATIENT MESSAGE (OUTPATIENT)
Dept: HEMATOLOGY/ONCOLOGY | Facility: CLINIC | Age: 55
End: 2025-07-23
Payer: COMMERCIAL

## 2025-07-25 ENCOUNTER — OFFICE VISIT (OUTPATIENT)
Dept: ENDOCRINOLOGY | Facility: CLINIC | Age: 55
End: 2025-07-25
Payer: COMMERCIAL

## 2025-07-25 DIAGNOSIS — E66.01 MORBID OBESITY: ICD-10-CM

## 2025-07-25 DIAGNOSIS — E11.69 HYPERLIPIDEMIA ASSOCIATED WITH TYPE 2 DIABETES MELLITUS: Chronic | ICD-10-CM

## 2025-07-25 DIAGNOSIS — I15.2 HYPERTENSION ASSOCIATED WITH DIABETES: Chronic | ICD-10-CM

## 2025-07-25 DIAGNOSIS — E06.3 HYPOTHYROIDISM DUE TO HASHIMOTO'S THYROIDITIS: ICD-10-CM

## 2025-07-25 DIAGNOSIS — E78.5 HYPERLIPIDEMIA ASSOCIATED WITH TYPE 2 DIABETES MELLITUS: Chronic | ICD-10-CM

## 2025-07-25 DIAGNOSIS — E55.9 VITAMIN D DEFICIENCY: Chronic | ICD-10-CM

## 2025-07-25 DIAGNOSIS — E11.9 TYPE 2 DIABETES MELLITUS WITHOUT COMPLICATION, WITHOUT LONG-TERM CURRENT USE OF INSULIN: ICD-10-CM

## 2025-07-25 DIAGNOSIS — E11.9 TYPE 2 DIABETES MELLITUS WITHOUT COMPLICATION, WITHOUT LONG-TERM CURRENT USE OF INSULIN: Primary | Chronic | ICD-10-CM

## 2025-07-25 DIAGNOSIS — E11.59 HYPERTENSION ASSOCIATED WITH DIABETES: Chronic | ICD-10-CM

## 2025-07-25 PROBLEM — Z86.39 H/O: HYPOTHYROIDISM: Status: RESOLVED | Noted: 2022-09-08 | Resolved: 2025-07-25

## 2025-07-25 RX ORDER — BLOOD-GLUCOSE SENSOR
EACH MISCELLANEOUS
Qty: 6 EACH | Refills: 3 | Status: SHIPPED | OUTPATIENT
Start: 2025-07-25

## 2025-07-25 RX ORDER — LEVOTHYROXINE SODIUM 200 UG/1
200 TABLET ORAL
Qty: 90 TABLET | Refills: 3 | Status: SHIPPED | OUTPATIENT
Start: 2025-07-25 | End: 2026-07-25

## 2025-07-25 NOTE — PROGRESS NOTES
The patient location is: [place]  The chief complaint leading to consultation is: diabetes    Visit type: audiovisual    Face to Face time with patient: 18 mins  22 minutes of total time spent on the encounter, which includes face to face time and non-face to face time preparing to see the patient (eg, review of tests), Obtaining and/or reviewing separately obtained history, Documenting clinical information in the electronic or other health record, Independently interpreting results (not separately reported) and communicating results to the patient/family/caregiver, or Care coordination (not separately reported).     Each patient to whom he or she provides medical services by telemedicine is:  (1) informed of the relationship between the physician and patient and the respective role of any other health care provider with respect to management of the patient; and (2) notified that he or she may decline to receive medical services by telemedicine and may withdraw from such care at any time.            CC: This 55 y.o. female presents for management of diabetes  and chronic conditions pending review including HTN, HLP, hypothyroidism     HPI: She was diagnosed with T2DM in October 2023. Has never been hospitalized r/t DM.  Family hx of DM: father and father's side of the family    No acute events since her last  Has not been taking ozempic or crestor - has not been taking daily - discussed taking consistently w Dr Phillips  Missed some LT4 doses back when she had the flu in May, none recently   Wearing Freestyle Guero 3- see download in Media tab  BG > 250 0%  BG > 180  12%  BG  88%  BG < 70 0%  GMI 6.9%  Very small pp excursions   Ovrall bg well controlled    Diet: Eats 2 Meals a day, snacks- Turkey sticks, cheese, apples, oranges  Skips breakfast   Exercise: none  CURRENT DM MEDS: none  Standards of Care:  Eye exam:  7/2024 [place]- She will schedule     Hypothyroidism:   Dx'ed in her early 20s  Fmh:  "brother w hyperthyroidism   Father w hypothyroidism   She takes LT4 200 mcg daily  No hoarseness, voice changes, dysphagia, compressive symptoms, or head/neck exposure to XRT.   Feels like she has something in her throat, when not eating or swallowing  No personal or FH of thyroid cancer or MEN syndrome.   Not taking biotin.   No tremors of the hands   +intolerance to the cold  + hair shedding since- Covid + 2022-    Works in [employer] for [email or web address]    ROS:   Gen: Appetite good,    Eyes: Denies visual disturbances  Resp: no SOB or MHAER   Cardiac: + palpitations at night maybe once a month lasts a few seconds "feels like a fish flopping" after getting into bed- none recently; no chest pain   GI: No nausea or vomiting, diarrhea, constipation   /GYN: No nocturia, burning or pain.   MS/Neuro: Denies numbness/ tingling in BLE; Gait steady, speech clear  Psych: + anxiety.  Other systems: negative.    PE:  GENERAL: Well developed, well nourished.  PSYCH: AAOx3, appropriate mood and affect, pleasant expression, conversant, appears relaxed, well groomed.   EYES: Conjunctiva, corneas clear  NECK: Supple, trachea midline,     FOOT EXAMINATION: 1/13/2025  No foot deformity, corns or callus formation,  nails in good condition and well trimmed, no interspace maceration or ulceration noted.  Decreased hair growth present over toes/feet.   Protective sensation intact with 10 gram monofilament.  +2 dorsalis pedis and posterior pulses noted.     Personally reviewed Past Medical, Surgical, Social History.    There were no vitals taken for this visit.     Personally reviewed the below labs:      Chemistry        Component Value Date/Time     07/18/2025 0937     03/12/2025 1436     01/11/2025 0818    K 4.1 07/18/2025 0937    K 3.9 03/12/2025 1436    K 4.3 01/11/2025 0818     07/18/2025 0937     01/11/2025 0818    CO2 23 07/18/2025 0937    CO2 27 03/12/2025 1436    CO2 27 01/11/2025 0818    BUN 13 07/18/2025 0937 "    CREATININE 0.8 07/18/2025 0937     (H) 07/18/2025 0937     (H) 01/11/2025 0818        Component Value Date/Time    CALCIUM 9.3 07/18/2025 0937    CALCIUM 10 03/12/2025 1436    CALCIUM 9.4 01/11/2025 0818    ALKPHOS 83 07/18/2025 0937    ALKPHOS 74 03/12/2025 1436    AST 24 07/18/2025 0937    AST 22 03/12/2025 1436    AST 17 01/11/2025 0818    ALT 26 07/18/2025 0937    ALT 24 03/12/2025 1436    ALT 19 01/11/2025 0818    BILITOT 0.5 07/18/2025 0937    BILITOT 0.4 03/12/2025 1436    BILITOT 0.6 01/11/2025 0818            Lab Results   Component Value Date    TSH 5.190 (H) 07/18/2025       Recent Labs   Lab 07/18/25  0937   LDL Cholesterol 165.0 H   HDL Cholesterol 40   Cholesterol Total 245 H        Results for orders placed or performed in visit on 07/18/25   Vitamin D    Collection Time: 07/18/25  9:37 AM   Result Value Ref Range    Vitamin D 19 (L) 30 - 96 ng/mL     No results found for this or any previous visit.    Lab Results   Component Value Date    MICALBCREAT 7.2 07/18/2025       Hemoglobin A1C   Date Value Ref Range Status   01/11/2025 6.6 (H) <5.7 % of total Hgb Final     Comment:     For someone without known diabetes, a hemoglobin A1c  value of 6.5% or greater indicates that they may have   diabetes and this should be confirmed with a follow-up   test.     For someone with known diabetes, a value <7% indicates   that their diabetes is well controlled and a value   greater than or equal to 7% indicates suboptimal   control. A1c targets should be individualized based on   duration of diabetes, age, comorbid conditions, and   other considerations.     Currently, no consensus exists regarding use of  hemoglobin A1c for diagnosis of diabetes for children.         01/07/2025 TNP % of total Hgb Final     Comment:     TEST NOT PERFORMED    No suitable specimen received.  Please review the test  requirements at   [facility]   05/02/2023 7.1 (H) <5.7 % of total Hgb Final      Comment:     For someone without known diabetes, a hemoglobin A1c  value of 6.5% or greater indicates that they may have   diabetes and this should be confirmed with a follow-up   test.    For someone with known diabetes, a value <7% indicates   that their diabetes is well controlled and a value   greater than or equal to 7% indicates suboptimal   control. A1c targets should be individualized based on   duration of diabetes, age, comorbid conditions, and   other considerations.    Currently, no consensus exists regarding use of  hemoglobin A1c for diagnosis of diabetes for children.       01/13/2022 7.9 (H) <5.7 % of total Hgb Final     Comment:     For someone without known diabetes, a hemoglobin A1c  value of 6.5% or greater indicates that they may have   diabetes and this should be confirmed with a follow-up   test.    For someone with known diabetes, a value <7% indicates   that their diabetes is well controlled and a value   greater than or equal to 7% indicates suboptimal   control. A1c targets should be individualized based on   duration of diabetes, age, comorbid conditions, and   other considerations.    Currently, no consensus exists regarding use of  hemoglobin A1c for diagnosis of diabetes for children.         Hemoglobin A1c   Date Value Ref Range Status   07/18/2025 6.4 (H) 4.0 - 5.6 % Final     Comment:     ADA Screening Guidelines:  5.7-6.4%  Consistent with prediabetes  >=6.5%  Consistent with diabetes    High levels of fetal hemoglobin interfere with the HbA1C  assay. Heterozygous hemoglobin variants (HbS, HgC, etc)do  not significantly interfere with this assay.   However, presence of multiple variants may affect accuracy.     % HEMOGLOBIN A1C   Date Value Ref Range Status   05/02/2023 7.1 (H) <5.7 % of total Hgb Final     Comment:     For someone without known diabetes, a hemoglobin A1c  value of 6.5% or greater indicates that they may have   diabetes and this should be confirmed with a follow-up    test.    For someone with known diabetes, a value <7% indicates   that their diabetes is well controlled and a value   greater than or equal to 7% indicates suboptimal   control. A1c targets should be individualized based on   duration of diabetes, age, comorbid conditions, and   other considerations.    Currently, no consensus exists regarding use of  hemoglobin A1c for diagnosis of diabetes for children.            ASSESSMENT and PLAN:      1. T2DM controlled  Start Ozempic 0.25 mg once weekly x 4 weeks.  At week 5 increase to 0.5 mg weekly    No fmh MEN or medullary thyroid cancer  No personal hx of pancreatitis    Continue Guero 3, notify me for issues  Set an alarm on her phone to remind her to take Ozempic    2. HTN - continue meds as previously prescribed and monitor.     3. HLP, +fmh HLP- resume statin daily    4. Hypothyroidism-  No recent missed doses, weight stable, Try brand Synthroid, previously w stable TSH on brand, Rx [facility]     5. Obesity-  Encouraged walking in her neighborhood  There is no height or weight on file to calculate BMI.    6. Vitamin d deficiency- Start 2000 IU daily, previously ergo weekly       Follow-up: TSH in 8 weeks- [facility],  in 6 months with  A1C , TSH- in person

## 2025-08-04 DIAGNOSIS — M25.551 RIGHT HIP PAIN: Primary | ICD-10-CM

## 2025-08-06 ENCOUNTER — HOSPITAL ENCOUNTER (OUTPATIENT)
Dept: RADIOLOGY | Facility: HOSPITAL | Age: 55
Discharge: HOME | End: 2025-08-06
Attending: NURSE PRACTITIONER
Payer: COMMERCIAL

## 2025-08-06 ENCOUNTER — HOSPITAL ENCOUNTER (OUTPATIENT)
Dept: RADIOLOGY | Facility: HOSPITAL | Age: 55
Discharge: HOME | End: 2025-08-06
Attending: ORTHOPAEDIC SURGERY
Payer: COMMERCIAL

## 2025-08-06 DIAGNOSIS — E06.3 HYPOTHYROIDISM DUE TO HASHIMOTO'S THYROIDITIS: ICD-10-CM

## 2025-08-06 DIAGNOSIS — M25.551 RIGHT HIP PAIN: ICD-10-CM

## 2025-08-06 PROCEDURE — 73502 X-RAY EXAM HIP UNI 2-3 VIEWS: CPT | Mod: 26,RT,, | Performed by: RADIOLOGY

## 2025-08-06 PROCEDURE — 73502 X-RAY EXAM HIP UNI 2-3 VIEWS: CPT | Mod: TC,PO,RT

## 2025-08-06 PROCEDURE — 76536 US EXAM OF HEAD AND NECK: CPT | Mod: 26,,, | Performed by: RADIOLOGY

## 2025-08-06 PROCEDURE — 76536 US EXAM OF HEAD AND NECK: CPT | Mod: TC,PO

## 2025-08-07 ENCOUNTER — PATIENT MESSAGE (OUTPATIENT)
Dept: ORTHOPEDICS | Facility: CLINIC | Age: 55
End: 2025-08-07

## 2025-08-07 ENCOUNTER — OFFICE VISIT (OUTPATIENT)
Dept: ORTHOPEDICS | Facility: CLINIC | Age: 55
End: 2025-08-07
Payer: COMMERCIAL

## 2025-08-07 VITALS — BODY MASS INDEX: 39.86 KG/M2 | WEIGHT: 248 LBS | HEIGHT: 66 IN

## 2025-08-07 DIAGNOSIS — M70.61 GREATER TROCHANTERIC BURSITIS OF RIGHT HIP: Primary | ICD-10-CM

## 2025-08-07 DIAGNOSIS — M25.551 RIGHT HIP PAIN: ICD-10-CM

## 2025-08-07 DIAGNOSIS — M76.891 HIP FLEXOR TENDINITIS, RIGHT: ICD-10-CM

## 2025-08-07 PROCEDURE — 99999 PR PBB SHADOW E&M-EST. PATIENT-LVL IV: CPT | Mod: PBBFAC,,, | Performed by: ORTHOPAEDIC SURGERY

## 2025-08-07 PROCEDURE — 3061F NEG MICROALBUMINURIA REV: CPT | Mod: CPTII,S$GLB,, | Performed by: ORTHOPAEDIC SURGERY

## 2025-08-07 PROCEDURE — 3008F BODY MASS INDEX DOCD: CPT | Mod: CPTII,S$GLB,, | Performed by: ORTHOPAEDIC SURGERY

## 2025-08-07 PROCEDURE — 3044F HG A1C LEVEL LT 7.0%: CPT | Mod: CPTII,S$GLB,, | Performed by: ORTHOPAEDIC SURGERY

## 2025-08-07 PROCEDURE — 20610 DRAIN/INJ JOINT/BURSA W/O US: CPT | Mod: RT,S$GLB,, | Performed by: ORTHOPAEDIC SURGERY

## 2025-08-07 PROCEDURE — 3066F NEPHROPATHY DOC TX: CPT | Mod: CPTII,S$GLB,, | Performed by: ORTHOPAEDIC SURGERY

## 2025-08-07 PROCEDURE — 1160F RVW MEDS BY RX/DR IN RCRD: CPT | Mod: CPTII,S$GLB,, | Performed by: ORTHOPAEDIC SURGERY

## 2025-08-07 PROCEDURE — 99204 OFFICE O/P NEW MOD 45 MIN: CPT | Mod: 25,S$GLB,, | Performed by: ORTHOPAEDIC SURGERY

## 2025-08-07 PROCEDURE — 1159F MED LIST DOCD IN RCRD: CPT | Mod: CPTII,S$GLB,, | Performed by: ORTHOPAEDIC SURGERY

## 2025-08-07 RX ADMIN — TRIAMCINOLONE ACETONIDE 40 MG: 40 INJECTION, SUSPENSION INTRA-ARTICULAR; INTRAMUSCULAR at 01:08

## 2025-08-08 ENCOUNTER — PATIENT MESSAGE (OUTPATIENT)
Dept: ENDOCRINOLOGY | Facility: CLINIC | Age: 55
End: 2025-08-08
Payer: COMMERCIAL

## 2025-08-08 ENCOUNTER — TELEPHONE (OUTPATIENT)
Dept: ENDOCRINOLOGY | Facility: CLINIC | Age: 55
End: 2025-08-08
Payer: COMMERCIAL

## 2025-08-08 DIAGNOSIS — E11.9 TYPE 2 DIABETES MELLITUS WITHOUT COMPLICATION, WITHOUT LONG-TERM CURRENT USE OF INSULIN: Primary | ICD-10-CM

## 2025-08-08 RX ORDER — PEN NEEDLE, DIABETIC 30 GX3/16"
NEEDLE, DISPOSABLE MISCELLANEOUS
Qty: 50 EACH | Refills: 1 | Status: SHIPPED | OUTPATIENT
Start: 2025-08-08

## 2025-08-08 RX ORDER — INSULIN ASPART 100 [IU]/ML
INJECTION, SOLUTION INTRAVENOUS; SUBCUTANEOUS
Qty: 3 ML | Refills: 1 | Status: SHIPPED | OUTPATIENT
Start: 2025-08-08

## 2025-08-14 RX ORDER — TRIAMCINOLONE ACETONIDE 40 MG/ML
40 INJECTION, SUSPENSION INTRA-ARTICULAR; INTRAMUSCULAR
Status: DISCONTINUED | OUTPATIENT
Start: 2025-08-07 | End: 2025-08-14 | Stop reason: HOSPADM

## 2025-08-15 ENCOUNTER — PATIENT OUTREACH (OUTPATIENT)
Dept: ADMINISTRATIVE | Facility: HOSPITAL | Age: 55
End: 2025-08-15
Payer: COMMERCIAL